# Patient Record
Sex: FEMALE | Race: WHITE | Employment: OTHER | URBAN - METROPOLITAN AREA
[De-identification: names, ages, dates, MRNs, and addresses within clinical notes are randomized per-mention and may not be internally consistent; named-entity substitution may affect disease eponyms.]

---

## 2017-02-20 PROBLEM — R33.9 INCOMPLETE EMPTYING OF BLADDER: Status: ACTIVE | Noted: 2017-02-20

## 2017-02-20 PROBLEM — N39.0 RECURRENT UTI: Status: ACTIVE | Noted: 2017-02-20

## 2017-04-06 ENCOUNTER — HOSPITAL ENCOUNTER (OUTPATIENT)
Dept: MAMMOGRAPHY | Age: 62
Discharge: HOME | End: 2017-04-06
Attending: OBSTETRICS & GYNECOLOGY
Payer: COMMERCIAL

## 2017-04-06 DIAGNOSIS — Z12.39 SCREENING FOR BREAST CANCER: ICD-10-CM

## 2017-04-06 PROCEDURE — 77067 SCR MAMMO BI INCL CAD: CPT

## 2018-04-20 ENCOUNTER — HOSPITAL ENCOUNTER (OUTPATIENT)
Dept: MAMMOGRAPHY | Age: 63
Discharge: HOME | End: 2018-04-20
Attending: OBSTETRICS & GYNECOLOGY
Payer: COMMERCIAL

## 2018-04-20 DIAGNOSIS — Z12.39 SCREENING FOR BREAST CANCER: ICD-10-CM

## 2018-04-20 PROCEDURE — 77067 SCR MAMMO BI INCL CAD: CPT

## 2019-09-17 ENCOUNTER — HOSPITAL ENCOUNTER (OUTPATIENT)
Dept: MAMMOGRAPHY | Age: 64
Discharge: HOME | End: 2019-09-17
Attending: OBSTETRICS & GYNECOLOGY
Payer: COMMERCIAL

## 2019-09-17 DIAGNOSIS — Z12.39 BREAST SCREENING, UNSPECIFIED: ICD-10-CM

## 2019-09-17 PROCEDURE — 77063 BREAST TOMOSYNTHESIS BI: CPT

## 2021-04-20 ENCOUNTER — TRANSCRIBE ORDER (OUTPATIENT)
Dept: SCHEDULING | Age: 66
End: 2021-04-20

## 2021-04-20 DIAGNOSIS — Z12.31 VISIT FOR SCREENING MAMMOGRAM: Primary | ICD-10-CM

## 2021-05-14 ENCOUNTER — HOSPITAL ENCOUNTER (OUTPATIENT)
Dept: MAMMOGRAPHY | Age: 66
Discharge: HOME | End: 2021-05-14
Attending: OBSTETRICS & GYNECOLOGY
Payer: MEDICARE

## 2021-05-14 DIAGNOSIS — Z12.31 VISIT FOR SCREENING MAMMOGRAM: ICD-10-CM

## 2021-05-14 PROCEDURE — 77063 BREAST TOMOSYNTHESIS BI: CPT

## 2022-03-18 PROBLEM — N39.0 RECURRENT UTI: Status: ACTIVE | Noted: 2017-02-20

## 2022-03-19 PROBLEM — R33.9 INCOMPLETE EMPTYING OF BLADDER: Status: ACTIVE | Noted: 2017-02-20

## 2022-08-01 ENCOUNTER — TELEPHONE (OUTPATIENT)
Dept: OBGYN CLINIC | Age: 67
End: 2022-08-01

## 2022-08-01 RX ORDER — PROGESTERONE 100 MG/1
CAPSULE ORAL
Qty: 90 CAPSULE | Refills: 0 | Status: SHIPPED | OUTPATIENT
Start: 2022-08-01 | End: 2022-10-18 | Stop reason: SDUPTHER

## 2022-08-01 NOTE — TELEPHONE ENCOUNTER
Pt called requesting refill for Prometrium. Rx sent in by Yoly Larson RN. Pt has AE scheduled with  on 10/5/22.

## 2022-08-05 ENCOUNTER — HOSPITAL ENCOUNTER (OUTPATIENT)
Dept: MAMMOGRAPHY | Age: 67
Discharge: HOME | End: 2022-08-08
Payer: MEDICARE

## 2022-08-05 DIAGNOSIS — Z12.31 SCREENING MAMMOGRAM FOR HIGH-RISK PATIENT: ICD-10-CM

## 2022-08-05 PROCEDURE — 77063 BREAST TOMOSYNTHESIS BI: CPT

## 2022-10-18 ENCOUNTER — OFFICE VISIT (OUTPATIENT)
Dept: OBGYN CLINIC | Age: 67
End: 2022-10-18
Payer: MEDICARE

## 2022-10-18 VITALS
BODY MASS INDEX: 31.65 KG/M2 | HEIGHT: 65 IN | SYSTOLIC BLOOD PRESSURE: 134 MMHG | WEIGHT: 190 LBS | DIASTOLIC BLOOD PRESSURE: 80 MMHG

## 2022-10-18 DIAGNOSIS — Z01.419 WELL WOMAN EXAM: Primary | ICD-10-CM

## 2022-10-18 DIAGNOSIS — N95.2 POSTMENOPAUSAL ATROPHIC VAGINITIS: ICD-10-CM

## 2022-10-18 DIAGNOSIS — Z13.89 SCREENING FOR HEMATURIA OR PROTEINURIA: ICD-10-CM

## 2022-10-18 DIAGNOSIS — L65.9 ALOPECIA: ICD-10-CM

## 2022-10-18 DIAGNOSIS — Z12.31 SCREENING MAMMOGRAM FOR BREAST CANCER: ICD-10-CM

## 2022-10-18 DIAGNOSIS — Z12.12 SCREENING FOR RECTAL CANCER: ICD-10-CM

## 2022-10-18 DIAGNOSIS — N39.0 FREQUENT UTI: ICD-10-CM

## 2022-10-18 LAB
AMORPHOUS CRYSTAL: NORMAL
BACTERIA URINE, POC: NORMAL
BILIRUBIN, URINE, POC: NORMAL
BLOOD URINE, POC: NORMAL
CASTS URINE, POC: NORMAL
CRYSTALS UA, POC: NORMAL
EPI CELLS URINE, POC: NORMAL
GLUCOSE URINE, POC: NORMAL
KETONES, URINE, POC: NORMAL
LEUKOCYTE ESTERASE, URINE, POC: NORMAL
NITRITE, URINE, POC: NORMAL
OTHER, URINE: NORMAL
PH, URINE, POC: NORMAL (ref 4.6–8)
PROTEIN,URINE, POC: NORMAL
RBC, URINE, POC: NORMAL
SPECIFIC GRAVITY, URINE, POC: NORMAL (ref 1–1.03)
URINALYSIS CLARITY, POC: NORMAL
URINALYSIS COLOR, POC: NORMAL
UROBILINOGEN, POC: NORMAL
WBC, URINE, POC: NORMAL

## 2022-10-18 PROCEDURE — 3017F COLORECTAL CA SCREEN DOC REV: CPT | Performed by: OBSTETRICS & GYNECOLOGY

## 2022-10-18 PROCEDURE — 99214 OFFICE O/P EST MOD 30 MIN: CPT | Performed by: OBSTETRICS & GYNECOLOGY

## 2022-10-18 PROCEDURE — G8417 CALC BMI ABV UP PARAM F/U: HCPCS | Performed by: OBSTETRICS & GYNECOLOGY

## 2022-10-18 PROCEDURE — 1090F PRES/ABSN URINE INCON ASSESS: CPT | Performed by: OBSTETRICS & GYNECOLOGY

## 2022-10-18 PROCEDURE — G8427 DOCREV CUR MEDS BY ELIG CLIN: HCPCS | Performed by: OBSTETRICS & GYNECOLOGY

## 2022-10-18 PROCEDURE — G8484 FLU IMMUNIZE NO ADMIN: HCPCS | Performed by: OBSTETRICS & GYNECOLOGY

## 2022-10-18 PROCEDURE — G8400 PT W/DXA NO RESULTS DOC: HCPCS | Performed by: OBSTETRICS & GYNECOLOGY

## 2022-10-18 PROCEDURE — 1123F ACP DISCUSS/DSCN MKR DOCD: CPT | Performed by: OBSTETRICS & GYNECOLOGY

## 2022-10-18 PROCEDURE — 81000 URINALYSIS NONAUTO W/SCOPE: CPT | Performed by: OBSTETRICS & GYNECOLOGY

## 2022-10-18 PROCEDURE — 1036F TOBACCO NON-USER: CPT | Performed by: OBSTETRICS & GYNECOLOGY

## 2022-10-18 RX ORDER — ESTRADIOL 10 UG/1
10 INSERT VAGINAL
Qty: 36 TABLET | Refills: 4 | Status: SHIPPED | OUTPATIENT
Start: 2022-10-19

## 2022-10-18 RX ORDER — PROGESTERONE 100 MG/1
CAPSULE ORAL
Qty: 90 CAPSULE | Refills: 4 | Status: SHIPPED | OUTPATIENT
Start: 2022-10-18

## 2022-10-18 RX ORDER — LEVOTHYROXINE SODIUM 0.12 MG/1
62.5 TABLET ORAL DAILY
COMMUNITY
Start: 2022-09-13 | End: 2023-09-13

## 2022-10-18 ASSESSMENT — ENCOUNTER SYMPTOMS
RESPIRATORY NEGATIVE: 1
BLOOD IN STOOL: 0
ALLERGIC/IMMUNOLOGIC NEGATIVE: 1
GASTROINTESTINAL NEGATIVE: 1
COUGH: 0
SHORTNESS OF BREATH: 0
EYES NEGATIVE: 1
ABDOMINAL PAIN: 0

## 2022-10-18 NOTE — PROGRESS NOTES
Shanelle Hopper is 77 y.o. female, [id number], who presents today for follow up. Continues on prometrium for hair loss which helps a lot. Also on vagifem for  atrophic vaginitis, Is concerned about more UTIs over the past year and some urinary urgency, leaks if she cannot \"make it. \"  H/o LAVH/Ant repair 7 yrs ago. No RUTHANN. Denies excess sweetened beverages. Does use vagifem 2-3x/wk      Mammogram/Pap Hx 10/18/2022   Mammogram Date 2022   Mammogram Result wnl   Pap Date 12/15/2015   Pap Result wnl     GYN Intake Questionnaire 10/18/2022   Date of Mammogram 2022   Result of Mammogram wnl   Date of Pap 12/15/2015   Pap result wnl       Contraception: postmenopausal  Has received Gardisil: NO  Last [place] 1 years ago  Last time cholesterol was checked: <1 years ago    OB History:   OB History    Para Term  AB Living   3 3 3     3   SAB IAB Ectopic Molar Multiple Live Births                    # Outcome Date GA Lbr Nelson/2nd Weight Sex Delivery Anes PTL Lv   3 Term            2 Term            1 Term                  Health Maintenance Due   Topic Date Due    Depression Screen  Never done    Hepatitis C screen  Never done    Diabetes screen  Never done    Shingles vaccine (1 of 2) Never done    DEXA (modify frequency per FRAX score)  Never done    Annual Wellness Visit (AWV)  Never done    Pneumococcal 65+ years Vaccine (3 - PPSV23 or PCV20) 2022         GYN History            Indy Ro  has a past medical history of Abnormal Pap smear, Asthma, exercise induced, Chicken pox, Depression, Factor 5 Leiden mutation, heterozygous ([facility] [address] Herpes simplex type 1 infection, History of cystocele, History of rectocele, Measles, Mumps, Nausea & vomiting, Pancreatitis, and UTI (urinary tract infection). .    Her surgeries include  has a past surgical history that includes Hysterectomy ();  Total hip arthroplasty (Left, 2021); bladder suspension (); orthopedic surgery (); orthopedic surgery (2014); Tonsillectomy; and Colonoscopy (04/2021). .    Allergies   Allergen Reactions    Bupropion Anaphylaxis        Her current meds are:   Current Outpatient Medications   Medication Sig    levothyroxine (SYNTHROID) 125 MCG tablet Take 62.5 mcg by mouth daily    progesterone (PROMETRIUM) 100 MG CAPS capsule TAKE ONE CAPSULE BY MOUTH EVERY NIGHT    [START ON 10/19/2022] Estradiol (VAGIFEM) 10 MCG TABS vaginal tablet Place 1 tablet vaginally three times a week    Estradiol (VAGIFEM) 10 MCG TABS vaginal tablet INSERT ONE TABLET VAGINALLY TWO TO THREE TIMES A WEEK    montelukast (SINGULAIR) 10 MG tablet Take 10 mg by mouth    nitrofurantoin (MACRODANTIN) 100 MG capsule Take POAfter intercourse. No current facility-administered medications for this visit. Family history is significant for family history includes Colon Cancer in her maternal grandfather; Colon Cancer (age of onset: 76) in her mother; Hypertension in her father and maternal grandfather; Ovarian Cancer (age of onset: 76) in her maternal aunt. Lubna Donahue  reports that she has never smoked. She has never used smokeless tobacco. She reports that she does not drink alcohol and does not use drugs. She completed her Systems Review which is documented below. Any positive systems not related to Gyn are recommended to discuss with her PCP. Review of Systems   Constitutional: Negative. Negative for unexpected weight change. HENT: Negative. Eyes: Negative. Respiratory: Negative. Negative for cough and shortness of breath. Cardiovascular: Negative. Negative for chest pain and palpitations. Gastrointestinal: Negative. Negative for abdominal pain and blood in stool. Endocrine: Negative. Genitourinary: Negative. Negative for difficulty urinating, dysuria, menstrual problem, pelvic pain and urgency. Musculoskeletal: Negative. Skin: Negative. Allergic/Immunologic: Negative. Neurological: Negative.     Hematological: Labia:         Right: No rash, tenderness or lesion. Left: No rash, tenderness or lesion. Vagina: Normal.      Uterus: Absent. Adnexa: Right adnexa normal and left adnexa normal.        Right: No tenderness. Left: No tenderness. Comments: Uterus and cervix surgically absent. Minimal relaxation. Good vaginal caliber. Cuff well supported. Musculoskeletal:         General: Normal range of motion. Cervical back: Normal range of motion and neck supple. Lymphadenopathy:      Upper Body:      Right upper body: No axillary adenopathy. Left upper body: No axillary adenopathy. Skin:     General: Skin is warm and dry. Neurological:      General: No focal deficit present. Mental Status: She is alert and oriented to person, place, and time. Psychiatric:         Mood and Affect: Mood normal.         Behavior: Behavior normal.         Thought Content: Thought content normal.         Judgment: Judgment normal.        Assessment/plan: Jm Castañeda was seen today for Annual Exam and Urinary Tract Infection (Frequent UTI's, finished Cipro last week)       Jm Castañeda was seen today for annual exam and urinary tract infection. Diagnoses and all orders for this visit:    Well woman exam  -     AMB POC OCCULT, OTHER SOURCE    Screening mammogram for breast cancer  -     [facility] KRISTEN DIGITAL SCREEN BILATERAL; Future    Screening for rectal cancer  -     AMB POC OCCULT, OTHER SOURCE    Frequent UTI - neg UA today. No significant recurrent pelvic relaxation noted. Rec limit/avoid sweetened beverages. Consume adequate H2O. Can try daily AZO cranberry. May need PCP referral to urology. -     AMB POC URINALYSIS DIP STICK MANUAL W/ MICRO    Screening for hematuria or proteinuria  -     AMB POC URINALYSIS DIP STICK MANUAL W/ MICRO    Postmenopausal atrophic vaginitis - stable on vERT, will continue.   -     Estradiol (VAGIFEM) 10 MCG TABS vaginal tablet; Place 1 tablet vaginally three times a week    Alopecia - risks of progesterone therapy including increased risk of Breast Ca discussed. Pt very happy with results. May want to wean in the next few years.   -     progesterone (PROMETRIUM) 100 MG CAPS capsule; TAKE ONE CAPSULE BY MOUTH EVERY NIGHT       Return for Annual. In a year

## 2022-12-02 ENCOUNTER — PROCEDURE VISIT (OUTPATIENT)
Dept: ENT CLINIC | Age: 67
End: 2022-12-02

## 2022-12-02 DIAGNOSIS — Z97.4 USES HEARING AID: Primary | ICD-10-CM

## 2022-12-02 PROCEDURE — V5014 HEARING AID REPAIR/MODIFYING: HCPCS | Performed by: AUDIOLOGIST

## 2022-12-02 NOTE — PROGRESS NOTES
Patient reports that her right hearing aid is not working. Cleaned and checked the aid. Changed the  wire out of warranty, dome, retention wire, and battery in the right aid. A listening check revealed that the aid is functioning correctly at this time. The patient reports no other concerns at this time.    Pearl Pearl CCC-A

## 2023-10-25 ENCOUNTER — OFFICE VISIT (OUTPATIENT)
Dept: OBGYN CLINIC | Age: 68
End: 2023-10-25
Payer: MEDICARE

## 2023-10-25 VITALS
DIASTOLIC BLOOD PRESSURE: 78 MMHG | SYSTOLIC BLOOD PRESSURE: 114 MMHG | BODY MASS INDEX: 28.82 KG/M2 | WEIGHT: 173 LBS | HEIGHT: 65 IN

## 2023-10-25 DIAGNOSIS — Z12.31 ENCOUNTER FOR SCREENING MAMMOGRAM FOR MALIGNANT NEOPLASM OF BREAST: ICD-10-CM

## 2023-10-25 DIAGNOSIS — N95.2 POSTMENOPAUSAL ATROPHIC VAGINITIS: ICD-10-CM

## 2023-10-25 DIAGNOSIS — Z01.419 WELL WOMAN EXAM WITH ROUTINE GYNECOLOGICAL EXAM: Primary | ICD-10-CM

## 2023-10-25 LAB
BILIRUBIN, URINE, POC: NEGATIVE
BLOOD URINE, POC: NEGATIVE
GLUCOSE URINE, POC: NEGATIVE
KETONES, URINE, POC: NEGATIVE
LEUKOCYTE ESTERASE, URINE, POC: NEGATIVE
NITRITE, URINE, POC: NEGATIVE
PH, URINE, POC: 6.5 (ref 4.6–8)
PROTEIN,URINE, POC: NEGATIVE
SPECIFIC GRAVITY, URINE, POC: 1 (ref 1–1.03)
URINALYSIS CLARITY, POC: CLEAR
URINALYSIS COLOR, POC: YELLOW
UROBILINOGEN, POC: NORMAL

## 2023-10-25 PROCEDURE — 81002 URINALYSIS NONAUTO W/O SCOPE: CPT | Performed by: OBSTETRICS & GYNECOLOGY

## 2023-10-25 PROCEDURE — G8417 CALC BMI ABV UP PARAM F/U: HCPCS | Performed by: OBSTETRICS & GYNECOLOGY

## 2023-10-25 PROCEDURE — G8427 DOCREV CUR MEDS BY ELIG CLIN: HCPCS | Performed by: OBSTETRICS & GYNECOLOGY

## 2023-10-25 PROCEDURE — G0101 CA SCREEN;PELVIC/BREAST EXAM: HCPCS | Performed by: OBSTETRICS & GYNECOLOGY

## 2023-10-25 RX ORDER — ESTRADIOL 10 UG/1
10 INSERT VAGINAL
Qty: 36 TABLET | Refills: 4 | Status: SHIPPED | OUTPATIENT
Start: 2023-10-25

## 2023-10-25 RX ORDER — ALENDRONATE SODIUM 70 MG/1
TABLET ORAL
COMMUNITY
Start: 2023-10-20

## 2023-10-25 SDOH — ECONOMIC STABILITY: INCOME INSECURITY: HOW HARD IS IT FOR YOU TO PAY FOR THE VERY BASICS LIKE FOOD, HOUSING, MEDICAL CARE, AND HEATING?: PATIENT DECLINED

## 2023-10-25 SDOH — ECONOMIC STABILITY: FOOD INSECURITY: WITHIN THE PAST 12 MONTHS, YOU WORRIED THAT YOUR FOOD WOULD RUN OUT BEFORE YOU GOT MONEY TO BUY MORE.: NEVER TRUE

## 2023-10-25 SDOH — ECONOMIC STABILITY: HOUSING INSECURITY
IN THE LAST 12 MONTHS, WAS THERE A TIME WHEN YOU DID NOT HAVE A STEADY PLACE TO SLEEP OR SLEPT IN A SHELTER (INCLUDING NOW)?: NO

## 2023-10-25 SDOH — ECONOMIC STABILITY: FOOD INSECURITY: WITHIN THE PAST 12 MONTHS, THE FOOD YOU BOUGHT JUST DIDN'T LAST AND YOU DIDN'T HAVE MONEY TO GET MORE.: NEVER TRUE

## 2023-10-25 ASSESSMENT — ENCOUNTER SYMPTOMS
ALLERGIC/IMMUNOLOGIC NEGATIVE: 1
BLOOD IN STOOL: 0
ABDOMINAL PAIN: 0
SHORTNESS OF BREATH: 0
RESPIRATORY NEGATIVE: 1
COUGH: 0
EYES NEGATIVE: 1
GASTROINTESTINAL NEGATIVE: 1

## 2023-10-25 ASSESSMENT — PATIENT HEALTH QUESTIONNAIRE - PHQ9
1. LITTLE INTEREST OR PLEASURE IN DOING THINGS: 0
SUM OF ALL RESPONSES TO PHQ QUESTIONS 1-9: 0
SUM OF ALL RESPONSES TO PHQ QUESTIONS 1-9: 0
SUM OF ALL RESPONSES TO PHQ9 QUESTIONS 1 & 2: 0
2. FEELING DOWN, DEPRESSED OR HOPELESS: 0
SUM OF ALL RESPONSES TO PHQ QUESTIONS 1-9: 0
SUM OF ALL RESPONSES TO PHQ QUESTIONS 1-9: 0

## 2023-10-25 NOTE — PROGRESS NOTES
Clarissa Dupree is 79 y.o. female, [id number], who presents today for a routine annual gynecological examination. No LMP recorded. Patient has had a hysterectomy. Isaiah Abbott Has been on prometrium for a number of years mostly for hair loss. Ready to wean. No problems, Doing well. No Gyn, Breast, G/U, or GI complaints. 10/25/2023     9:00 AM 10/18/2022    10:00 AM   Mammogram/Pap Hx   Mammogram Date 2022   Mammogram Result neg wnl   Pap Date 12/15/2015 12/15/2015   Pap Result neg wnl         10/25/2023     9:00 AM 10/18/2022    10:00 AM   GYN Intake Questionnaire   Date of Mammogram 2022   Result of Mammogram neg wnl   Date of Pap 12/15/2015 12/15/2015   Pap result neg wnl       Contraception: postmenopausal  Has received Gardisil: NO  Last [address] 2 years ago  Last time cholesterol was checked: 1 years ago    OB History:   OB History    Para Term  AB Living   3 3 3     3   SAB IAB Ectopic Molar Multiple Live Births                    # Outcome Date GA Lbr Nelson/2nd Weight Sex Delivery Anes PTL Lv   3 Term            2 Term            1 Term                  Health Maintenance Due   Topic Date Due    Depression Screen  Never done    Hepatitis C screen  Never done    Lipids  Never done    Annual Wellness Visit (AWV)  Never done    Pneumococcal 65+ years Vaccine (3 - PPSV23 or PCV20) 2022         GYN History            [facility]  has a past medical history of Abnormal Pap smear, Asthma, exercise induced, Chicken pox, Depression, Factor 5 Leiden mutation, heterozygous ([address] [facility]), Herpes simplex type 1 infection, History of cystocele, History of rectocele, Measles, Mumps, Nausea & vomiting, Pancreatitis, and UTI (urinary tract infection). .    Her surgeries include  has a past surgical history that includes Hysterectomy (); Total hip arthroplasty (Left, 2021); bladder suspension (); orthopedic surgery (); orthopedic surgery ();  Tonsillectomy; and Colonoscopy

## 2023-10-30 DIAGNOSIS — L65.9 ALOPECIA: ICD-10-CM

## 2023-10-30 RX ORDER — PROGESTERONE 100 MG/1
CAPSULE ORAL
Qty: 90 CAPSULE | Refills: 4 | Status: SHIPPED | OUTPATIENT
Start: 2023-10-30

## 2023-12-27 ENCOUNTER — HOSPITAL ENCOUNTER (OUTPATIENT)
Dept: MAMMOGRAPHY | Age: 68
Discharge: HOME | End: 2023-12-30
Payer: MEDICARE

## 2023-12-27 VITALS — HEIGHT: 65 IN | WEIGHT: 175 LBS | BODY MASS INDEX: 29.16 KG/M2

## 2023-12-27 DIAGNOSIS — Z12.31 ENCOUNTER FOR SCREENING MAMMOGRAM FOR MALIGNANT NEOPLASM OF BREAST: ICD-10-CM

## 2023-12-27 PROCEDURE — 77063 BREAST TOMOSYNTHESIS BI: CPT

## 2024-10-29 NOTE — PROGRESS NOTES
adnexa normal and left adnexa normal.        Right: No tenderness.          Left: No tenderness.        Comments: Uterus and cervix surgically absent.  There is generalized pelvic relaxation.  Musculoskeletal:         General: Normal range of motion.      Cervical back: Normal range of motion and neck supple.   Lymphadenopathy:      Upper Body:      Right upper body: No axillary adenopathy.      Left upper body: No axillary adenopathy.   Skin:     General: Skin is warm and dry.   Neurological:      General: No focal deficit present.      Mental Status: She is alert and oriented to person, place, and time.   Psychiatric:         Mood and Affect: Mood normal.         Behavior: Behavior normal.         Thought Content: Thought content normal.         Judgment: Judgment normal.          Assessment/plan: Mikaela was seen today for Annual Exam       Mikaela was seen today for annual exam.    Diagnoses and all orders for this visit:    Well woman exam with routine gynecological exam    Encounter for screening mammogram for malignant neoplasm of breast  -     [facility] KRISTEN DIGITAL SCREEN BILATERAL; Future    Postmenopausal atrophic vaginitis  -     Estradiol (VAGIFEM) 10 MCG TABS vaginal tablet; Place 1 tablet vaginally three times a week    Alopecia  -     Discontinue: progesterone (PROMETRIUM) 100 MG CAPS capsule; TAKE ONE CAPSULE BY MOUTH EVERY NIGHT    Pelvic floor dysfunction  -     Ambulatory Referral to Physical Therapy - Pelvic Health         Return in about 1 year (around 10/30/2025) for Annual.

## 2024-10-30 ENCOUNTER — OFFICE VISIT (OUTPATIENT)
Dept: OBGYN CLINIC | Age: 69
End: 2024-10-30
Payer: MEDICARE

## 2024-10-30 VITALS — SYSTOLIC BLOOD PRESSURE: 118 MMHG | BODY MASS INDEX: 30.12 KG/M2 | WEIGHT: 181 LBS | DIASTOLIC BLOOD PRESSURE: 72 MMHG

## 2024-10-30 DIAGNOSIS — Z12.31 ENCOUNTER FOR SCREENING MAMMOGRAM FOR MALIGNANT NEOPLASM OF BREAST: ICD-10-CM

## 2024-10-30 DIAGNOSIS — Z01.419 WELL WOMAN EXAM WITH ROUTINE GYNECOLOGICAL EXAM: Primary | ICD-10-CM

## 2024-10-30 DIAGNOSIS — M62.89 PELVIC FLOOR DYSFUNCTION: ICD-10-CM

## 2024-10-30 DIAGNOSIS — N95.2 POSTMENOPAUSAL ATROPHIC VAGINITIS: ICD-10-CM

## 2024-10-30 DIAGNOSIS — L65.9 ALOPECIA: ICD-10-CM

## 2024-10-30 PROCEDURE — G0101 CA SCREEN;PELVIC/BREAST EXAM: HCPCS | Performed by: OBSTETRICS & GYNECOLOGY

## 2024-10-30 PROCEDURE — G8419 CALC BMI OUT NRM PARAM NOF/U: HCPCS | Performed by: OBSTETRICS & GYNECOLOGY

## 2024-10-30 PROCEDURE — G8427 DOCREV CUR MEDS BY ELIG CLIN: HCPCS | Performed by: OBSTETRICS & GYNECOLOGY

## 2024-10-30 RX ORDER — ESTRADIOL 10 UG/1
10 INSERT VAGINAL
Qty: 36 TABLET | Refills: 4 | Status: SHIPPED | OUTPATIENT
Start: 2024-10-30

## 2024-10-30 RX ORDER — PROGESTERONE 100 MG/1
CAPSULE ORAL
Qty: 90 CAPSULE | Refills: 4 | Status: SHIPPED | OUTPATIENT
Start: 2024-10-30 | End: 2024-10-30

## 2024-10-30 SDOH — ECONOMIC STABILITY: FOOD INSECURITY: WITHIN THE PAST 12 MONTHS, THE FOOD YOU BOUGHT JUST DIDN'T LAST AND YOU DIDN'T HAVE MONEY TO GET MORE.: NEVER TRUE

## 2024-10-30 SDOH — ECONOMIC STABILITY: FOOD INSECURITY: WITHIN THE PAST 12 MONTHS, YOU WORRIED THAT YOUR FOOD WOULD RUN OUT BEFORE YOU GOT MONEY TO BUY MORE.: NEVER TRUE

## 2024-10-30 SDOH — ECONOMIC STABILITY: INCOME INSECURITY: HOW HARD IS IT FOR YOU TO PAY FOR THE VERY BASICS LIKE FOOD, HOUSING, MEDICAL CARE, AND HEATING?: NOT HARD AT ALL

## 2024-10-30 ASSESSMENT — ENCOUNTER SYMPTOMS
ALLERGIC/IMMUNOLOGIC NEGATIVE: 1
RESPIRATORY NEGATIVE: 1
EYES NEGATIVE: 1
BLOOD IN STOOL: 0
SHORTNESS OF BREATH: 0
GASTROINTESTINAL NEGATIVE: 1
COUGH: 0
ABDOMINAL PAIN: 0

## 2024-10-30 ASSESSMENT — PATIENT HEALTH QUESTIONNAIRE - PHQ9
SUM OF ALL RESPONSES TO PHQ QUESTIONS 1-9: 0
SUM OF ALL RESPONSES TO PHQ9 QUESTIONS 1 & 2: 0
2. FEELING DOWN, DEPRESSED OR HOPELESS: NOT AT ALL
1. LITTLE INTEREST OR PLEASURE IN DOING THINGS: NOT AT ALL

## 2024-11-23 DIAGNOSIS — L65.9 ALOPECIA: ICD-10-CM

## 2024-11-25 RX ORDER — PROGESTERONE 100 MG/1
CAPSULE ORAL
Qty: 90 CAPSULE | Refills: 4 | OUTPATIENT
Start: 2024-11-25

## 2024-12-04 RX ORDER — PROGESTERONE 100 MG/1
100 CAPSULE ORAL DAILY
Qty: 90 CAPSULE | Refills: 3 | Status: SHIPPED | OUTPATIENT
Start: 2024-12-04 | End: 2024-12-04 | Stop reason: ALTCHOICE

## 2024-12-05 DIAGNOSIS — Z78.0 MENOPAUSE: Primary | ICD-10-CM

## 2024-12-05 RX ORDER — PROGESTERONE 100 MG/1
100 CAPSULE ORAL DAILY
Qty: 90 CAPSULE | Refills: 0 | Status: SHIPPED | OUTPATIENT
Start: 2024-12-05

## 2024-12-11 ENCOUNTER — HOSPITAL ENCOUNTER (OUTPATIENT)
Dept: PHYSICAL THERAPY | Age: 69
Setting detail: RECURRING SERIES
Discharge: HOME | End: 2024-12-14
Attending: OBSTETRICS & GYNECOLOGY
Payer: MEDICARE

## 2024-12-11 DIAGNOSIS — R39.89 OTHER SYMPTOMS AND SIGNS INVOLVING THE GENITOURINARY SYSTEM: ICD-10-CM

## 2024-12-11 DIAGNOSIS — N39.41 URGE INCONTINENCE: Primary | ICD-10-CM

## 2024-12-11 DIAGNOSIS — R27.8 OTHER LACK OF COORDINATION: ICD-10-CM

## 2024-12-11 PROCEDURE — 97530 THERAPEUTIC ACTIVITIES: CPT

## 2024-12-11 PROCEDURE — 97161 PT EVAL LOW COMPLEX 20 MIN: CPT

## 2024-12-11 ASSESSMENT — PAIN SCALES - GENERAL: PAINLEVEL_OUTOF10: 0

## 2024-12-11 NOTE — THERAPY EVALUATION
Mikaela Dhillon  : 1955  Primary: Medicare Part A And B (Medicare)  Secondary: [facility] STATE [facility]  [address]  [address]  [facility] [address]  Phone: [phone]  Fax: [phone] Plan Frequency: 1xweek/ 6 weeks    Plan of Care/Certification Expiration Date: 25        Plan of Care/Certification Expiration Date:  Plan of Care/Certification Expiration Date: 25    Frequency/Duration:   Plan Frequency: 1xweek/ 6 weeks      Time In/Out:   Time In: 1000  Time Out: 1048    PT Visit Info:    Plan Frequency: 1xweek/ 6 weeks      Visit Count:  1                OUTPATIENT PHYSICAL THERAPY:             Initial Assessment 2024               Episode (PFPT)         Treatment Diagnosis:     Urge incontinence  Other symptoms and signs involving the genitourinary system  Other lack of coordination  Contributing Diagnosis:  Functional urinary incontinence (R39.81), Hesitancy of micturition (R39.11), Pelvic floor dysfunction in female (M62.98), and Urgency of urination (R39.15)  Medical/Referring Diagnosis:    Pelvic floor dysfunction [M62.89]      Referring Physician:  Reynaldo Glover MD MD Orders:  PT Eval and Treat   Return MD Appt:  10/31/25  Date of Onset:  Onset Date: 23     Allergies:  Bupropion  Restrictions/Precautions:    None      Medications Last Reviewed:  2024     SUBJECTIVE   History of Injury/Illness (Reason for Referral):  Ms. Dhillon is a 70 yo female referred to pelvic floor physical therapy (PFPT) by Reynaldo Glover MD 2/2 Pelvic floor dysfunction [M62.89].    Pt states that since hysterectomy and bladder repair () she has been having several UTIs. She states that she is still struggling with UUI (RUTHANN has improved since surgery). She states that she is still having urgency and leakage while trying to get to the restroom. She states that since surgery she is experiencing fecal incontinence. She states that she also struggles with

## 2024-12-11 NOTE — PROGRESS NOTES
[facility] ROOM 4 SFERMAM SFE   1/2/2025  2:00 PM Mandy Flood, PT SFO[facility]PT SFO   1/8/2025 11:00 AM Mandy Flood, PT SFO[facility]PT S[facility]   1/15/2025 11:00 AM Mandy Flood, PT SFOORPT SFO   1/22/2025 11:00 AM Mandy Flood, PT SFOORPT SFO   10/31/2025  9:15 AM Reynaldo Glover MD [facility] GVL AMB

## 2024-12-18 ENCOUNTER — HOSPITAL ENCOUNTER (OUTPATIENT)
Dept: PHYSICAL THERAPY | Age: 69
Setting detail: RECURRING SERIES
Discharge: HOME | End: 2024-12-21
Attending: OBSTETRICS & GYNECOLOGY
Payer: MEDICARE

## 2024-12-18 PROCEDURE — 97110 THERAPEUTIC EXERCISES: CPT

## 2024-12-18 ASSESSMENT — PAIN SCALES - GENERAL: PAINLEVEL_OUTOF10: 0

## 2024-12-18 NOTE — PROGRESS NOTES
consistency, exercise, hot fluid and colonic massage to facilitate gastric movement, toilet positioning to improve bowel evacuation and bowel routine to improve bowel regularity., Functional activity training on bowel massage/colonic massage. Pt provided a handout with instructions to increase performance and understanding in order to facilitate gut motility and improve bowel health., and Functional activity training to improve toilet positioning and pelvic floor muscle relaxation, to increase anorectal angle in order to improve bowel/bladder emptying and minimize straining/paradoxical PFM activation during defecation.    TA Educational Topic Notes Date Completed   Pathology/Anatomy/PFM Function Edu on functio of PFM in relation to symptoms  12/11/24   Bladder health education Edu and given HO (PFM relaxation, JIC and urinary frequency)  12/11/24   Urinary urge suppression     The knack     Voiding strategies     Nocturia tips     Bowel/Bladder log Given bladder diary 12/11/24   Bowel health education     Constipation care Edu and given HO 12/11/24   Diarrhea/Fecal leakage     Colonic massage Edu and given HO  12/11/24   Toilet positioning     Defecation dynamics Edu and given HO  12/11/24   Sources of fiber     Return to intercourse/Dilator training     Sexual positioning     Lubricants/vaginal moisturizers     Vulvovaginal health/vaginal irritants     Body mechanics     Posture/ergonomics     Diaphragmatic breathing     Resources and technology     Other patient education       Chaperone for Intimate Exam  Chaperone was offered as part of the rooming process. Patient declined and agrees to continue with exam without a chaperone.; Internal examination was referred today until next visit due to patient having infection   Chaperone: JANAE  Pt gives verbal consent to internal vaginal assessment/treatment.    Treatment/Session Summary:    Treatment Assessment:   Good tolerance to th er ex. Patient reported some fatigue

## 2024-12-26 ENCOUNTER — HOSPITAL ENCOUNTER (OUTPATIENT)
Dept: PHYSICAL THERAPY | Age: 69
Setting detail: RECURRING SERIES
Discharge: HOME | End: 2024-12-29
Attending: OBSTETRICS & GYNECOLOGY
Payer: MEDICARE

## 2024-12-26 PROCEDURE — 97110 THERAPEUTIC EXERCISES: CPT

## 2024-12-26 ASSESSMENT — PAIN SCALES - GENERAL: PAINLEVEL_OUTOF10: 0

## 2024-12-26 NOTE — PROGRESS NOTES
floor muscle relaxation, to increase anorectal angle in order to improve bowel/bladder emptying and minimize straining/paradoxical PFM activation during defecation.    TA Educational Topic Notes Date Completed   Pathology/Anatomy/PFM Function Edu on functio of PFM in relation to symptoms  12/11/24   Bladder health education Edu and given HO (PFM relaxation, JIC and urinary frequency)  12/11/24   Urinary urge suppression     The knack     Voiding strategies     Nocturia tips     Bowel/Bladder log Given bladder diary 12/11/24   Bowel health education     Constipation care Edu and given HO 12/11/24   Diarrhea/Fecal leakage     Colonic massage Edu and given HO  12/11/24   Toilet positioning     Defecation dynamics Edu and given HO  12/11/24   Sources of fiber     Return to intercourse/Dilator training     Sexual positioning     Lubricants/vaginal moisturizers     Vulvovaginal health/vaginal irritants     Body mechanics     Posture/ergonomics     Diaphragmatic breathing     Resources and technology     Other patient education       Chaperone for Intimate Exam  Chaperone was offered as part of the rooming process. Patient declined and agrees to continue with exam without a chaperone.;    Chaperone: NA  Pt gives verbal consent to internal vaginal assessment/treatment.    Treatment/Session Summary:    Treatment Assessment:   No adverse effects with there ex today. Discussed IC and symptoms associated with IC.   Communication/Consultation:  None today  Equipment provided today:  None  Recommendations/Intent for next treatment session: Next visit will focus on strengthening   >Total Treatment Billable Duration:  treatment: 47 minutes  TE  Time In: 1600  Time Out: 1647    EVELINE JOHNSON PT         Charge Capture  Events  [facility] Portal  Appt Desk  Attendance Report     Future Appointments   Date Time Provider Department Center   12/30/2024  4:00 PM [facility]   1/2/2025  2:00 PM Remigio

## 2025-01-02 ENCOUNTER — HOSPITAL ENCOUNTER (OUTPATIENT)
Dept: PHYSICAL THERAPY | Age: 70
Setting detail: RECURRING SERIES
Discharge: HOME | End: 2025-01-05
Attending: OBSTETRICS & GYNECOLOGY
Payer: MEDICARE

## 2025-01-02 PROCEDURE — 97110 THERAPEUTIC EXERCISES: CPT

## 2025-01-02 ASSESSMENT — PAIN SCALES - GENERAL: PAINLEVEL_OUTOF10: 0

## 2025-01-02 NOTE — PROGRESS NOTES
Mikaela Arce Jacquie  : 1955  Primary: Medicare Part A [place] B (Medicare)  [facility] [place] [facility]  [address]  [address]  [facility] [address]  Phone: [phone]  Fax: [phone] Plan Frequency: 1xweek/ 6 weeks    Plan of Care/Certification Expiration Date: 25        Plan of Care/Certification Expiration Date:  Plan of Care/Certification Expiration Date: 25    Frequency/Duration:   Plan Frequency: 1xweek/ 6 weeks      Time In/Out:   Time In: 1359  Time Out: 1444      PT Visit Info:         Visit Count:  4    OUTPATIENT PHYSICAL THERAPY:   Treatment Note 2025       Episode  (PFPT)               Treatment Diagnosis:    Urge incontinence  Other symptoms and signs involving the genitourinary system  Other lack of coordination  Medical/Referring Diagnosis:    Pelvic floor dysfunction [M62.89]    Referring Physician:  Reynaldo Glover MD MD Orders:  PT Eval and Treat   Return MD Appt:  10/31/25   Date of Onset:  Onset Date: 23     Allergies:   Bupropion  Restrictions/Precautions:   None      Interventions Planned (Treatment may consist of any combination of the following):     See Assessment Note    Subjective Comments:   States on 2nd round of antibiotics and feeling better. Feels like PFM may be getting stronger. Urgency/frequency symptoms hard to tell if improving secondary to UTIs.   Initial Pain Level::     0/10  Post Session Pain Level:       0/10  Medications Last Reviewed:  2025  Updated Objective Findings:  None Today   Treatment   THERAPEUTIC EXERCISE: (45 minutes): Exercises per grid below to improve strength and coordination.     Date:  24 Date:  24 Date:  24 Date:  25   Activity/Exercise Parameters Parameters Parameters Parameters   HEP None this day       Diaphragmatic breathing   3 min     Supine PF + TA   30 reps  30 reps    S/l PF + TA + ball press down  15 ea side  15 ea side     Quadurped PF + TA    30 reps ea side  30

## 2025-01-08 ENCOUNTER — HOSPITAL ENCOUNTER (OUTPATIENT)
Dept: PHYSICAL THERAPY | Age: 70
Setting detail: RECURRING SERIES
Discharge: HOME | End: 2025-01-11
Attending: OBSTETRICS & GYNECOLOGY
Payer: MEDICARE

## 2025-01-08 PROCEDURE — 97110 THERAPEUTIC EXERCISES: CPT

## 2025-01-08 ASSESSMENT — PAIN SCALES - GENERAL: PAINLEVEL_OUTOF10: 0

## 2025-01-08 NOTE — PROGRESS NOTES
Mikaela Arce Jacquie  : 1955  Primary: Medicare Part A [place] (Medicare)  [place] [employer] [place] [facility]  [address]  [address]  [facility] [address]  Phone: [phone]  Fax: [phone] Plan Frequency: 1xweek/ 6 weeks    Plan of Care/Certification Expiration Date: 25        Plan of Care/Certification Expiration Date:  Plan of Care/Certification Expiration Date: 25    Frequency/Duration:   Plan Frequency: 1xweek/ 6 weeks      Time In/Out:   Time In: 1110  Time Out: 1150      PT Visit Info:         Visit Count:  5    OUTPATIENT PHYSICAL THERAPY:   Treatment Note 2025       Episode  (PFPT)               Treatment Diagnosis:    Urge incontinence  Other symptoms and signs involving the genitourinary system  Other lack of coordination  Medical/Referring Diagnosis:    Pelvic floor dysfunction [M62.89]    Referring Physician:  Reynaldo Glover MD MD Orders:  PT Eval and Treat   Return MD Appt:  10/31/25   Date of Onset:  Onset Date: 23     Allergies:   Bupropion  Restrictions/Precautions:   None      Interventions Planned (Treatment may consist of any combination of the following):     See Assessment Note    Subjective Comments:   No more UTI symptoms.   Initial Pain Level::     0/10  Post Session Pain Level:       0/10  Medications Last Reviewed:  2025  Updated Objective Findings:  None Today   Treatment   THERAPEUTIC EXERCISE: (40 minutes): Exercises per grid below to improve strength and coordination.     Date:  24 Date:  24 Date:  24 Date:  25 Date:   25   Activity/Exercise Parameters Parameters Parameters Parameters    HEP None this day        Diaphragmatic breathing   3 min      Supine PF + TA   30 reps  30 reps     S/l PF + TA + ball press down  15 ea side  15 ea side      Quadurped PF + TA    30 reps ea side  30 reps ea side     Bridges    PF + TA coordinated with breath 30 reps  PF + TA coordinated with breath 30 reps  PF + TA

## 2025-01-15 ENCOUNTER — HOSPITAL ENCOUNTER (OUTPATIENT)
Dept: PHYSICAL THERAPY | Age: 70
Setting detail: RECURRING SERIES
Discharge: HOME | End: 2025-01-18
Attending: OBSTETRICS & GYNECOLOGY
Payer: MEDICARE

## 2025-01-15 PROCEDURE — 97110 THERAPEUTIC EXERCISES: CPT

## 2025-01-15 ASSESSMENT — PAIN SCALES - GENERAL: PAINLEVEL_OUTOF10: 0

## 2025-01-15 NOTE — PROGRESS NOTES
Mikaela Arce Jacquie  : 1955  Primary: Medicare Part A [place] B (Medicare)  [place] [employer] [place] [facility]  [address]  [address]  [facility] [address]  Phone: [phone]  Fax: [phone] Plan Frequency: 1xweek/ 6 weeks    Plan of Care/Certification Expiration Date: 25        Plan of Care/Certification Expiration Date:  Plan of Care/Certification Expiration Date: 25    Frequency/Duration:   Plan Frequency: 1xweek/ 6 weeks      Time In/Out:   Time In: 1103  Time Out: 1152      PT Visit Info:         Visit Count:  6    OUTPATIENT PHYSICAL THERAPY:   Treatment Note 1/15/2025       Episode  (PFPT)               Treatment Diagnosis:    Urge incontinence  Other symptoms and signs involving the genitourinary system  Other lack of coordination  Medical/Referring Diagnosis:    Pelvic floor dysfunction [M62.89]    Referring Physician:  Reynaldo Glover MD MD Orders:  PT Eval and Treat   Return MD Appt:  10/31/25   Date of Onset:  Onset Date: 23     Allergies:   Bupropion  Restrictions/Precautions:   None      Interventions Planned (Treatment may consist of any combination of the following):     See Assessment Note    Subjective Comments:   Some diff maintain control with squats ( no leakage) but standing hip abduction feels good. Feels like in general symptoms are starting to improve.   Initial Pain Level::     0/10  Post Session Pain Level:       0/10  Medications Last Reviewed:  1/15/2025  Updated Objective Findings:  None Today   Treatment   THERAPEUTIC EXERCISE: (49 minutes): Exercises per grid below to improve strength and coordination.     Date:  24 Date:  24 Date:  24 Date:  25 Date:   25 Date:  1/15/25   Activity/Exercise Parameters Parameters Parameters Parameters parameters Parameters    HEP None this day         Diaphragmatic breathing   3 min       Supine PF + TA   30 reps  30 reps      S/l PF + TA + ball press down  15 ea side  15 ea side

## 2025-01-22 ENCOUNTER — APPOINTMENT (OUTPATIENT)
Dept: PHYSICAL THERAPY | Age: 70
End: 2025-01-22
Attending: OBSTETRICS & GYNECOLOGY
Payer: MEDICARE

## 2025-02-06 ENCOUNTER — HOSPITAL ENCOUNTER (OUTPATIENT)
Dept: MAMMOGRAPHY | Age: 70
Discharge: HOME | End: 2025-02-09
Attending: OBSTETRICS & GYNECOLOGY
Payer: MEDICARE

## 2025-02-06 VITALS — BODY MASS INDEX: 29.45 KG/M2 | WEIGHT: 177 LBS

## 2025-02-06 DIAGNOSIS — Z12.31 ENCOUNTER FOR SCREENING MAMMOGRAM FOR MALIGNANT NEOPLASM OF BREAST: ICD-10-CM

## 2025-02-06 PROCEDURE — 77063 BREAST TOMOSYNTHESIS BI: CPT

## 2025-02-19 ENCOUNTER — HOSPITAL ENCOUNTER (OUTPATIENT)
Dept: PHYSICAL THERAPY | Age: 70
Setting detail: RECURRING SERIES
Discharge: HOME | End: 2025-02-22
Attending: OBSTETRICS & GYNECOLOGY
Payer: MEDICARE

## 2025-02-19 PROCEDURE — 97110 THERAPEUTIC EXERCISES: CPT

## 2025-02-19 ASSESSMENT — PAIN SCALES - GENERAL: PAINLEVEL_OUTOF10: 0

## 2025-02-19 NOTE — THERAPY DISCHARGE
then 30 minutes away from home, and social activities outside of the home.      Therapy Problem List: (Impacting functional limitations):    Decreased Strength, Decreased ROM, Decreased Functional Mobility, Decreased [place] with Home Exercise Program, Decreased Posture, Decreased Body Mechanics, and Decreased Activity Tolerance/Endurance*   Therapy Prognosis:   Good     Initial Assessment Complexity:   Low Complexity     PLAN   Effective Dates: 12/11/2024 TO Plan of Care/Certification Expiration Date: 03/04/25     Frequency/Duration: Plan Frequency: 1xweek/ 6 weeks     Interventions Planned (Treatment may consist of any combination of the following):    Home Exercise Program (HEP), Therapeutic Activites, and Therapeutic Exercise/Strengthening     Outcome Measure:   Pelvic Floor Impact Questionnaire--short form 7 (PFIQ-7):  Score:   12/11/24:  20.63   Initial: 12/11/24  Bladder or Urine: 38.10/100  Bowel or Rectum: 23.81/100  Vagina or Pelvis: 0/100 Most Recent: X (Date: -- )  Bladder or Urine: X/100  Bowel or Rectum: X/100  Vagina or Pelvis: X/100   Interpretation of Score: Each of the 7 sections is scored on a scale from 0-3; 0 representing \"Not at all\", 3 representing \"Quite a bit\". The mean value is taken from all the answered items, then multiplied by 100 to obtain the scale score, ranging from 0-100.  This process is repeated for each column representing bowel, bladder, and pelvic pain.    Goals: (Goals have been discussed and agreed upon with patient.)  Short-Term Functional Goals: Time Frame: 6 weeks  Pt will demonstrate I with basic PFM HEP to improve awareness, coordination, and timing of PFM.  Patient will demonstrate understanding of and ability to teach back two strategies to reduce constipation and improve toileting to minimize strain on and/or paradoxical movement of the pelvic floor muscles.  Patient will demonstrate understanding of and ability to teach back appropriate water intake, bladder

## 2025-03-25 NOTE — PROGRESS NOTES
Mikaela Arce Jacquie  : 1955  Primary: Medicare Part [place] (Medicare)  Secondary: [employer] [place] [facility]  [address]  [address]  [facility] [address]  Phone: [phone]  Fax: [phone] Plan Frequency: 1xweek/ 6 weeks    Plan of Care/Certification Expiration Date: 25        Plan of Care/Certification Expiration Date:  Plan of Care/Certification Expiration Date: 25    Frequency/Duration:   Plan Frequency: 1xweek/ 6 weeks      Time In/Out:   Time In: 1101  Time Out: 1130      PT Visit Info:         Visit Count:  7    OUTPATIENT PHYSICAL THERAPY:   Treatment Note 2025       Episode  (PFPT)               Treatment Diagnosis:    Urge incontinence  Other symptoms and signs involving the genitourinary system  Other lack of coordination  Medical/Referring Diagnosis:    Pelvic floor dysfunction [M62.89]    Referring Physician:  Reynaldo Glover MD MD Orders:  PT Eval and Treat   Return MD Appt:  10/31/25   Date of Onset:  Onset Date: 23     Allergies:   Bupropion  Restrictions/Precautions:   None      Interventions Planned (Treatment may consist of any combination of the following):     See Assessment Note    Subjective Comments:   States still getting urgency, however, kegels have been helpful with maintaining that. States less leakage as well   Initial Pain Level::     0/10  Post Session Pain Level:       0/10  Medications Last Reviewed:  2025  Updated Objective Findings:  None Today   Treatment   THERAPEUTIC EXERCISE: (49 minutes): Exercises per grid below to improve strength and coordination.     Date:  24 Date:  24 Date:  24 Date:  25 Date:   25 Date:  1/15/25 Date:  25   Activity/Exercise Parameters Parameters Parameters Parameters parameters Parameters  Parameters   HEP None this day          Diaphragmatic breathing   3 min        Supine PF + TA   30 reps  30 reps       S/l PF + TA + ball press down  15 ea side  15 ea side      
GOAL: Pt will demonstrate improved static/dynamic standing balance to good, in order to improve stability, decrease fall risk and increase independence with ADLs within 4 weeks.